# Patient Record
Sex: MALE | Race: WHITE | NOT HISPANIC OR LATINO | URBAN - METROPOLITAN AREA
[De-identification: names, ages, dates, MRNs, and addresses within clinical notes are randomized per-mention and may not be internally consistent; named-entity substitution may affect disease eponyms.]

---

## 2017-06-12 ENCOUNTER — EMERGENCY (EMERGENCY)
Facility: HOSPITAL | Age: 77
LOS: 0 days | Discharge: HOME | End: 2017-06-13
Attending: EMERGENCY MEDICINE
Payer: MEDICARE

## 2017-06-12 VITALS
HEART RATE: 87 BPM | HEIGHT: 68 IN | WEIGHT: 177.91 LBS | DIASTOLIC BLOOD PRESSURE: 84 MMHG | RESPIRATION RATE: 16 BRPM | SYSTOLIC BLOOD PRESSURE: 160 MMHG | OXYGEN SATURATION: 95 % | TEMPERATURE: 99 F

## 2017-06-12 LAB
ALBUMIN SERPL ELPH-MCNC: 4.4 G/DL — SIGNIFICANT CHANGE UP (ref 3.3–5)
ALP SERPL-CCNC: 54 U/L — SIGNIFICANT CHANGE UP (ref 40–120)
ALT FLD-CCNC: 31 U/L — SIGNIFICANT CHANGE UP (ref 12–78)
ANION GAP SERPL CALC-SCNC: 9 MMOL/L — SIGNIFICANT CHANGE UP (ref 5–17)
APTT BLD: 28 SEC — SIGNIFICANT CHANGE UP (ref 27.5–37.4)
AST SERPL-CCNC: 23 U/L — SIGNIFICANT CHANGE UP (ref 15–37)
BASOPHILS # BLD AUTO: 0.1 K/UL — SIGNIFICANT CHANGE UP (ref 0–0.2)
BASOPHILS NFR BLD AUTO: 0.9 % — SIGNIFICANT CHANGE UP (ref 0–2)
BILIRUB SERPL-MCNC: 0.4 MG/DL — SIGNIFICANT CHANGE UP (ref 0.2–1.2)
BUN SERPL-MCNC: 29 MG/DL — HIGH (ref 7–23)
CALCIUM SERPL-MCNC: 9.6 MG/DL — SIGNIFICANT CHANGE UP (ref 8.5–10.1)
CHLORIDE SERPL-SCNC: 105 MMOL/L — SIGNIFICANT CHANGE UP (ref 96–108)
CK MB BLD-MCNC: 0.9 % — SIGNIFICANT CHANGE UP (ref 0–3.5)
CK MB CFR SERPL CALC: 2 NG/ML — SIGNIFICANT CHANGE UP (ref 0.5–3.6)
CK SERPL-CCNC: 214 U/L — SIGNIFICANT CHANGE UP (ref 26–308)
CO2 SERPL-SCNC: 27 MMOL/L — SIGNIFICANT CHANGE UP (ref 22–31)
CREAT SERPL-MCNC: 1.48 MG/DL — HIGH (ref 0.5–1.3)
EOSINOPHIL # BLD AUTO: 0.1 K/UL — SIGNIFICANT CHANGE UP (ref 0–0.5)
EOSINOPHIL NFR BLD AUTO: 1.7 % — SIGNIFICANT CHANGE UP (ref 0–6)
GLUCOSE SERPL-MCNC: 115 MG/DL — HIGH (ref 70–99)
HCT VFR BLD CALC: 40.6 % — SIGNIFICANT CHANGE UP (ref 39–50)
HGB BLD-MCNC: 14.6 G/DL — SIGNIFICANT CHANGE UP (ref 13–17)
INR BLD: 0.97 RATIO — SIGNIFICANT CHANGE UP (ref 0.88–1.16)
LYMPHOCYTES # BLD AUTO: 2.6 K/UL — SIGNIFICANT CHANGE UP (ref 1–3.3)
LYMPHOCYTES # BLD AUTO: 30.2 % — SIGNIFICANT CHANGE UP (ref 13–44)
MCHC RBC-ENTMCNC: 33.4 PG — SIGNIFICANT CHANGE UP (ref 27–34)
MCHC RBC-ENTMCNC: 35.9 GM/DL — SIGNIFICANT CHANGE UP (ref 32–36)
MCV RBC AUTO: 92.9 FL — SIGNIFICANT CHANGE UP (ref 80–100)
MONOCYTES # BLD AUTO: 0.6 K/UL — SIGNIFICANT CHANGE UP (ref 0–0.9)
MONOCYTES NFR BLD AUTO: 6.8 % — SIGNIFICANT CHANGE UP (ref 2–14)
NEUTROPHILS # BLD AUTO: 5.1 K/UL — SIGNIFICANT CHANGE UP (ref 1.8–7.4)
NEUTROPHILS NFR BLD AUTO: 60.4 % — SIGNIFICANT CHANGE UP (ref 43–77)
PLATELET # BLD AUTO: 213 K/UL — SIGNIFICANT CHANGE UP (ref 150–400)
POTASSIUM SERPL-MCNC: 4.1 MMOL/L — SIGNIFICANT CHANGE UP (ref 3.5–5.3)
POTASSIUM SERPL-SCNC: 4.1 MMOL/L — SIGNIFICANT CHANGE UP (ref 3.5–5.3)
PROT SERPL-MCNC: 8.3 GM/DL — SIGNIFICANT CHANGE UP (ref 6–8.3)
PROTHROM AB SERPL-ACNC: 10.6 SEC — SIGNIFICANT CHANGE UP (ref 9.8–12.7)
RBC # BLD: 4.37 M/UL — SIGNIFICANT CHANGE UP (ref 4.2–5.8)
RBC # FLD: 11.4 % — SIGNIFICANT CHANGE UP (ref 11–15)
SODIUM SERPL-SCNC: 141 MMOL/L — SIGNIFICANT CHANGE UP (ref 135–145)
TROPONIN I SERPL-MCNC: <.015 NG/ML — SIGNIFICANT CHANGE UP (ref 0.01–0.04)
WBC # BLD: 8.5 K/UL — SIGNIFICANT CHANGE UP (ref 3.8–10.5)
WBC # FLD AUTO: 8.5 K/UL — SIGNIFICANT CHANGE UP (ref 3.8–10.5)

## 2017-06-12 PROCEDURE — 99285 EMERGENCY DEPT VISIT HI MDM: CPT

## 2017-06-12 PROCEDURE — 71010: CPT | Mod: 26

## 2017-06-12 PROCEDURE — 70450 CT HEAD/BRAIN W/O DYE: CPT | Mod: 26

## 2017-06-12 RX ORDER — SODIUM CHLORIDE 9 MG/ML
3 INJECTION INTRAMUSCULAR; INTRAVENOUS; SUBCUTANEOUS ONCE
Qty: 0 | Refills: 0 | Status: COMPLETED | OUTPATIENT
Start: 2017-06-12 | End: 2017-06-12

## 2017-06-12 RX ORDER — SIMVASTATIN 20 MG/1
1 TABLET, FILM COATED ORAL
Qty: 0 | Refills: 0 | COMMUNITY

## 2017-06-12 RX ORDER — GABAPENTIN 400 MG/1
1 CAPSULE ORAL
Qty: 0 | Refills: 0 | COMMUNITY

## 2017-06-12 RX ADMIN — SODIUM CHLORIDE 3 MILLILITER(S): 9 INJECTION INTRAMUSCULAR; INTRAVENOUS; SUBCUTANEOUS at 22:24

## 2017-06-12 NOTE — ED PROVIDER NOTE - OBJECTIVE STATEMENT
76yo male with pmh neuropathy on gabapentin, HL, presents with mild generalized dull headache x 2 days. Pt was in [facility] yesterday at [facility] with syncope vs seizure. Per wife, pt was standing and she noted rt arm shaking. Then he sat down, shaking both arms and staring for ~ 1 min. Then pt came right out of it. Pt does not recall event. no neck stiffness, meningmus, fever, photophobia.     ROS: No fever/chills. No photophobia/eye pain/changes in vision, No ear pain/sore throat/dysphagia, No chest pain/palpitations. No SOB/cough/stridor. No abdominal pain, N/V/D, no black/bloody bm. No dysuria/frequency/discharge, + headache. No Dizziness.  No rash.  No numbness/tingling/weakness.

## 2017-06-12 NOTE — ED PROVIDER NOTE - MEDICAL DECISION MAKING DETAILS
Lab values do not require emergent intervention. CT scan demonstrates no acute pathology. Pt well appearing. Asymptomatic. Pt does not want to stay and wants to fu with pmd. Discussed results and outcome of testing with the patient.  Patient given copy of available results. Patient advised to please follow up with their PMD within the next 24 hours and return to the Emergency Department for worsening symptoms or any other concerns.

## 2017-06-12 NOTE — ED ADULT TRIAGE NOTE - CHIEF COMPLAINT QUOTE
Sent by Dr Fan for evaluation of headache x 2 days, seizure like activity yesterday vs syncope, pt didn't want to go in ambulance yesterday

## 2017-06-12 NOTE — ED ADULT NURSE NOTE - OBJECTIVE STATEMENT
As per wife, patient was in [facility], stood up  and became pale, breathing funny, hands became shaky, spaced out. Patient did not want to go to the hosptial yesterday. Sent by PMD for. Patient co headache x3 days, denies N/V. No hx of seizures.

## 2017-06-13 VITALS
OXYGEN SATURATION: 100 % | RESPIRATION RATE: 19 BRPM | TEMPERATURE: 98 F | HEART RATE: 74 BPM | DIASTOLIC BLOOD PRESSURE: 80 MMHG | SYSTOLIC BLOOD PRESSURE: 169 MMHG

## 2017-06-13 DIAGNOSIS — R56.9 UNSPECIFIED CONVULSIONS: ICD-10-CM

## 2017-06-13 DIAGNOSIS — N40.0 BENIGN PROSTATIC HYPERPLASIA WITHOUT LOWER URINARY TRACT SYMPTOMS: ICD-10-CM

## 2017-06-13 DIAGNOSIS — G62.9 POLYNEUROPATHY, UNSPECIFIED: ICD-10-CM

## 2017-06-13 DIAGNOSIS — E78.5 HYPERLIPIDEMIA, UNSPECIFIED: ICD-10-CM

## 2017-06-13 DIAGNOSIS — R51 HEADACHE: ICD-10-CM

## 2022-06-08 ENCOUNTER — FORM ENCOUNTER (OUTPATIENT)
Age: 82
End: 2022-06-08

## 2022-06-09 ENCOUNTER — APPOINTMENT (OUTPATIENT)
Dept: MRI IMAGING | Facility: CLINIC | Age: 82
End: 2022-06-09
Payer: MEDICARE

## 2022-06-09 ENCOUNTER — APPOINTMENT (OUTPATIENT)
Dept: ORTHOPEDIC SURGERY | Facility: CLINIC | Age: 82
End: 2022-06-09
Payer: MEDICARE

## 2022-06-09 VITALS — WEIGHT: 173 LBS | HEIGHT: 68 IN | BODY MASS INDEX: 26.22 KG/M2

## 2022-06-09 DIAGNOSIS — G62.9 POLYNEUROPATHY, UNSPECIFIED: ICD-10-CM

## 2022-06-09 DIAGNOSIS — I10 ESSENTIAL (PRIMARY) HYPERTENSION: ICD-10-CM

## 2022-06-09 DIAGNOSIS — M54.16 RADICULOPATHY, LUMBAR REGION: ICD-10-CM

## 2022-06-09 PROCEDURE — 72170 X-RAY EXAM OF PELVIS: CPT

## 2022-06-09 PROCEDURE — 72100 X-RAY EXAM L-S SPINE 2/3 VWS: CPT

## 2022-06-09 PROCEDURE — 72148 MRI LUMBAR SPINE W/O DYE: CPT | Mod: MH

## 2022-06-09 PROCEDURE — 99204 OFFICE O/P NEW MOD 45 MIN: CPT

## 2022-06-09 PROCEDURE — 73110 X-RAY EXAM OF WRIST: CPT | Mod: LT

## 2022-06-09 RX ORDER — ATORVASTATIN CALCIUM 20 MG/1
20 TABLET, FILM COATED ORAL
Refills: 0 | Status: ACTIVE | COMMUNITY

## 2022-06-09 RX ORDER — AMLODIPINE BESYLATE AND BENAZEPRIL HYDROCHLORIDE 5; 20 MG/1; MG/1
5-20 CAPSULE ORAL
Refills: 0 | Status: ACTIVE | COMMUNITY

## 2022-06-16 ENCOUNTER — APPOINTMENT (OUTPATIENT)
Dept: ORTHOPEDIC SURGERY | Facility: CLINIC | Age: 82
End: 2022-06-16

## 2022-06-16 VITALS — WEIGHT: 173 LBS | BODY MASS INDEX: 26.22 KG/M2 | HEIGHT: 68 IN

## 2022-06-16 PROCEDURE — 99214 OFFICE O/P EST MOD 30 MIN: CPT | Mod: 25

## 2022-06-16 PROCEDURE — 20550 NJX 1 TENDON SHEATH/LIGAMENT: CPT

## 2022-06-16 PROCEDURE — 20526 THER INJECTION CARP TUNNEL: CPT | Mod: LT,59

## 2022-06-16 NOTE — REASON FOR VISIT
[Spouse] : spouse [FreeTextEntry2] : follow up for left writst. patient states numbness in left wrist

## 2022-06-16 NOTE — HISTORY OF PRESENT ILLNESS
[de-identified] : 6/16/22:  Pt has n/t in left hand at night.  The n/t is intermittent.  He does not have n/t with night bracing.  Right RF triggering\par \par LHD\par \par EMG:\par 1. Moderate left carpal tunnel\par 2. C6 radiculopathy

## 2022-06-16 NOTE — IMAGING
[de-identified] : left hand no swelling or deformity\par No thenar atrophy\par full actve range of motion\par decreased sensation to the median nerve\par intact ulnar and radial sensation\par ain/pin/ulnar motor intact\par +tinels, phalens and durkans, negative spurling sign\par palpable pulses with CR<2s\par full motion to the elbow, shoulder\par

## 2022-06-21 NOTE — HISTORY OF PRESENT ILLNESS
[5] : 5 [3] : 3 [Dull/Aching] : dull/aching [Constant] : constant [Rest] : rest [de-identified] : back pain and last few months and increased pain r leg.  for months . No falls or injuries and did not lift anything. No numbness and no giving way. . Gabapentin. . Neuropathy. No known cause neuropathy. On gabapentin 800 mg 3 times a day.\par Tingling left hand and  and numbness fingers, Had emg and ncs in past few months.. No recent mri. . No neck pain,.\par \par Referred to see me from Dr Huang. [] : no [FreeTextEntry5] : pt feels numbness in left hand at night.  the pain in the back has  been bad. pt has been using a brace in the left hand.  pt feels discomfort in the back .  [de-identified] : motion

## 2022-06-21 NOTE — IMAGING
[Disc space narrowing] : Disc space narrowing [Spondylolithesis] : Spondylolithesis [AP] : anteroposterior [There are no fractures, subluxations or dislocations. No significant abnormalities are seen] : There are no fractures, subluxations or dislocations. No significant abnormalities are seen [Mild arthritis (Tonnis Grade 1)] : Mild arthritis (Tonnis Grade 1) [Left] : left wrist [Degenerative change] : Degenerative change [FreeTextEntry8] : marked cmc oa

## 2022-06-21 NOTE — PHYSICAL EXAM
[Flexion] : flexion [Extension] : extension [Left] : left hand [CMC Joint] : CMC joint [2nd Finger] : 2nd finger [3rd Finger] : 3rd finger [4th Finger] : 4th finger [Volar Wrist] : volar wrist [5___] : pinch 5[unfilled]/5 [de-identified] : extension 10 degrees [de-identified] : left lateral bending 15 degrees [de-identified] : right lateral bending 15 degrees [de-identified] : right lateral bending 15 degrees [] : negative Finkelstein's [TWNoteComboBox7] : dorsiflexion 60 degrees [TWNoteComboBox4] : volarflexion 70 degrees

## 2022-06-21 NOTE — DISCUSSION/SUMMARY
[de-identified] : The patient was advised of the diagnosis. The natural history of the pathology was explained in full to the patient in layman's terms. All questions were answered. The risks and benefits of surgical and non-surgical treatment alternatives were explained in full to the patient.\par \par Carpal tunnel discussed and has emg with severe carpal tunnel. cortisone injection would be very temporary and advised to see Dr Richardson for carpal tunnel release \par \par Lumbar spine has failed at PT and advised to have mri lumbar spine and return for reeval and likely referral to pain management. all explained

## 2022-06-21 NOTE — DATA REVIEWED
[EMG Nerve Conduction] : A EMG Nerve Conduction test was completed of the [Bilateral] : bilateral [FreeTextEntry1] : left carpal tunnel and left cervical radiculopathy

## 2022-06-22 ENCOUNTER — APPOINTMENT (OUTPATIENT)
Dept: ORTHOPEDIC SURGERY | Facility: CLINIC | Age: 82
End: 2022-06-22
Payer: MEDICARE

## 2022-06-22 VITALS — WEIGHT: 173 LBS | BODY MASS INDEX: 26.22 KG/M2 | HEIGHT: 68 IN

## 2022-06-22 DIAGNOSIS — M48.061 SPINAL STENOSIS, LUMBAR REGION WITHOUT NEUROGENIC CLAUDICATION: ICD-10-CM

## 2022-06-22 DIAGNOSIS — R93.7 ABNORMAL FINDINGS ON DIAGNOSTIC IMAGING OF OTHER PARTS OF MUSCULOSKELETAL SYSTEM: ICD-10-CM

## 2022-06-22 PROCEDURE — 99214 OFFICE O/P EST MOD 30 MIN: CPT

## 2022-07-01 ENCOUNTER — APPOINTMENT (OUTPATIENT)
Dept: PAIN MANAGEMENT | Facility: CLINIC | Age: 82
End: 2022-07-01

## 2022-07-01 VITALS — WEIGHT: 172 LBS | HEIGHT: 68 IN | BODY MASS INDEX: 26.07 KG/M2

## 2022-07-01 PROCEDURE — 99204 OFFICE O/P NEW MOD 45 MIN: CPT

## 2022-07-01 PROCEDURE — 99214 OFFICE O/P EST MOD 30 MIN: CPT

## 2022-07-01 NOTE — DATA REVIEWED
[FreeTextEntry1] : MRI of the lumbar spine was reviewed and independently interpreted by me:\par 1. Nonspecific moderate patchy marrow signal heterogeneity throughout the osseous structures in addition to degenerative endplate changes in the lower lumbar spine. Recommend whole body bone scan to confirm that there is no underlying diffuse marrow replacement process.\par 2. Convexity of the lumbar spine towards the left, exaggerated lower lumbar lordosis, anterolisthesis at L5-S1 and multilevel degenerative disc disease with multilevel disc herniations, central stenosis, and nerve root impingement which appears most severe on the right greater than left at L4-L5.\par 3. Degenerative changes in the lower thoracic spine.\par 4. Findings suggesting large bilateral renal cysts right greater than left measuring up to 5 cm in the superior right kidney. Consider ultrasound of the kidneys to further evaluate as clinically indicated.\par 5. No acute fracture is suspected.\par 6. No gross retroperitoneal adenopathy or gross retroperitoneal/paravertebral soft tissue mass.

## 2022-07-01 NOTE — REASON FOR VISIT
How Severe Are Your Spot(S)?: mild Hpi Title: Evaluation of Skin Lesions Year Removed: 1900 [Initial Consultation] : an initial pain management consultation

## 2022-07-01 NOTE — PHYSICAL EXAM
[de-identified] : Constitutional; Appears well, no apparent distress\par Ability to communicate: Normal \par Respiratory: non-labored breathing\par Skin: No rash noted\par Head: Normocephalic, atraumatic\par Neck: no visible thyroid enlargement\par Eyes: Extraocular movements intact\par Neurologic: Alert and oriented x3\par Psychiatric: normal mood, affect and behavior \par \par  [Flexion] : flexion [Extension] : extension [] : non-antalgic

## 2022-07-01 NOTE — DISCUSSION/SUMMARY
[de-identified] : After discussing various treatment options with the patient including but not limited to oral medications, physical therapy, exercise modalities as well as interventional spinal injections, we have decided with the following plan:\par \par - Continue Home exercises, stretching, activity modification, physical therapy, and conservative care.\par - MRI report and/or images was reviewed and discussed with the patient.\par - Recommend Right L4-5, L5-S1 Transforaminal Epidural Steroid Injection under fluoroscopic guidance with image.\par - The risks, benefits and alternatives of the proposed procedure were explained in detail with the patient. The risks outlined include but are not limited to infection, bleeding, post-dural puncture headache, nerve injury, a temporary increase in pain, failure to resolve symptoms, allergic reaction, symptom recurrence, and possible elevation of blood sugar in diabetics. All questions were answered to patient's apparent satisfaction and he/she verbalized an understanding.\par - Patient is presenting with acute/sub-acute radicular pain with impairment in ADLs and functionality.  The pain has not responded to conservative care including NSAID therapy and/or physical therapy.  There is no bleeding tendency, unstable medical condition, or systemic infection.\par - Follow up in 1-2 weeks post injection for re-evaluation.\par

## 2022-07-01 NOTE — HISTORY OF PRESENT ILLNESS
[Lower back] : lower back [8] : 8 [6] : 6 [Dull/Aching] : dull/aching [Radiating] : radiating [Sharp] : sharp [Constant] : constant [Physical therapy] : physical therapy [Walking] : walking [] : no [FreeTextEntry1] : right  [FreeTextEntry7] : right buttock  [de-identified] : turning  [de-identified] : L MRI

## 2022-07-03 NOTE — PHYSICAL EXAM
[Volar Wrist] : volar wrist [Flexion] : flexion [Extension] : extension [Left] : left hand [CMC Joint] : CMC joint [5___] : pinch 5[unfilled]/5 [2nd Finger] : 2nd finger [3rd Finger] : 3rd finger [4th Finger] : 4th finger [de-identified] : extension 10 degrees [de-identified] : left lateral bending 15 degrees [de-identified] : right lateral bending 15 degrees [de-identified] : right lateral bending 15 degrees [] : negative Phalen's [TWNoteComboBox7] : dorsiflexion 60 degrees [TWNoteComboBox4] : volarflexion 70 degrees

## 2022-07-03 NOTE — DISCUSSION/SUMMARY
[de-identified] : discussed work up marrow edema and to see PCP Dr Ireland for blood tests to include SPEP, CBC, CMP, CEA and urine for bence Keyes proteins to r/o a systemic process. all explained. pain maanggement to see, continue swimming. for renal cyst to see his urologist. all explained to him and his wife

## 2022-07-03 NOTE — DISCUSSION/SUMMARY
[de-identified] : discussed work up marrow edema and to see PCP Dr Ireland for blood tests to include SPEP, CBC, CMP, CEA and urine for bence Keyes proteins to r/o a systemic process. all explained. pain maanggement to see, continue swimming. for renal cyst to see his urologist. all explained to him and his wife

## 2022-07-03 NOTE — PHYSICAL EXAM
[Volar Wrist] : volar wrist [Flexion] : flexion [Extension] : extension [Left] : left hand [CMC Joint] : CMC joint [5___] : pinch 5[unfilled]/5 [2nd Finger] : 2nd finger [3rd Finger] : 3rd finger [4th Finger] : 4th finger [de-identified] : extension 10 degrees [de-identified] : left lateral bending 15 degrees [de-identified] : right lateral bending 15 degrees [de-identified] : right lateral bending 15 degrees [] : negative Phalen's [TWNoteComboBox7] : dorsiflexion 60 degrees [TWNoteComboBox4] : volarflexion 70 degrees

## 2022-07-03 NOTE — HISTORY OF PRESENT ILLNESS
[de-identified] : back and leg pain persists and wants to consider epidurals. also discussed hands that were injected and have done wll

## 2022-07-18 ENCOUNTER — APPOINTMENT (OUTPATIENT)
Dept: ORTHOPEDIC SURGERY | Facility: CLINIC | Age: 82
End: 2022-07-18

## 2022-07-18 VITALS — BODY MASS INDEX: 26.07 KG/M2 | WEIGHT: 172 LBS | HEIGHT: 68 IN

## 2022-07-18 LAB — SARS-COV-2 N GENE NPH QL NAA+PROBE: NOT DETECTED

## 2022-07-18 PROCEDURE — 99213 OFFICE O/P EST LOW 20 MIN: CPT

## 2022-07-18 NOTE — IMAGING
[de-identified] : left hand no swelling or deformity\par No thenar atrophy\par full active range of motion\par normal sensation to the median nerve distribution. \par intact ulnar and radial sensation\par ain/pin/ulnar motor intact\par -tinels, phalens and durkans, negative spurling sign\par palpable pulses with CR<2s\par full motion to the elbow, shoulder\par

## 2022-07-18 NOTE — ASSESSMENT
[FreeTextEntry1] : Right ring trigger finger and left carpal tunnel syndrome has resolved s/p CSI?\par Pt is allowed to rto prn.

## 2022-07-18 NOTE — HISTORY OF PRESENT ILLNESS
[de-identified] : 7/18/2022: Pt states right ring and left carpal tunnel syndrome has resolved.\par \par 6/16/22:  Pt has n/t in left hand at night.  The n/t is intermittent.  He does not have n/t with night bracing.  Right RF triggering\par \par LHD\par \par EMG:\par 1. Moderate left carpal tunnel\par 2. C6 radiculopathy

## 2022-07-19 ENCOUNTER — APPOINTMENT (OUTPATIENT)
Dept: PAIN MANAGEMENT | Facility: CLINIC | Age: 82
End: 2022-07-19

## 2022-07-19 PROCEDURE — 64483 NJX AA&/STRD TFRM EPI L/S 1: CPT | Mod: RT

## 2022-07-19 PROCEDURE — 64484 NJX AA&/STRD TFRM EPI L/S EA: CPT | Mod: RT

## 2022-07-19 PROCEDURE — J3490M: CUSTOM

## 2022-07-19 NOTE — PROCEDURE
[FreeTextEntry3] : Date of Service: 07/19/2022 \par \par Account: [id number]\par \par Patient: GUSTAVO TENA \par \par YOB: 1940\par \par Age: 82 year\par \par \par Surgeon:                                                          Petey Walsh D.O.\par \par Assistant:                                                         None.\par \par Pre-Operative Diagnosis:                             Lumbosacral radiculitis (M54.17)\par \par Post-Operative Diagnosis:                           Same\par \par Procedure:                                                      Right L4-5, L5-S1 transforaminal epidural steroid injection under fluoroscopic guidance.\par \par Anesthesia:                                                     Local with MAC\par \par \par This procedure was carried out using fluoroscopic guidance.  The risks and benefits of the procedure were discussed extensively with the patient.  The consent of the patient was obtained and the following procedure was performed. The patient was placed in the prone position on the fluoroscopic table and the lumbar area was prepped and draped in a sterile fashion. A timeout was performed with all essential staff present and the site and side were verified.\par \par The right L4-5, L5-S1 neural foramen were identified on right oblique "agustín dog" anatomical view at the 6 o'clock position using fluoroscopic guidance, and the area was marked. The overlying skin and subcutaneous structures were anesthetized using sterile technique with 1% Lidocaine.  A 22-gauge spinal needle was directed toward the inferior (6 o'clock) position of the pedicle, which formed the roof of the identified foramen.  Once in the epidural space, after negative aspiration for heme and CSF, 1 cc of Omnipaque contrast was injected under live fluoroscopy to confirm epidural location and assess filling defects and rule out intravascular needle placement. \par \par The following contrast flow and epidurogram was observed: no intravascular or intrathecal flow pattern was noted.  No blood or CSF was aspirated. Contrast spread appeared to outline the right L4 and L5 nerve roots and spread medially into the epidural space.  \par \par After this, 3 ml of a total mixture of 12 mg betamethasone, 2 ml of preservative free normal saline, and 2 ml of 0.25% bupivacaine was administered without any resistance in the epidural space at each of the two levels. \par \par The needle was subsequently removed.  Vital signs remained normal.  Pulse oximeter was used throughout the procedure and the patient's pulse and oxygen saturation remained within normal limits.  The patient tolerated the procedure well.  There were no complications.  The patient was instructed to apply ice over the injection sites for twenty minutes every two hours for the next 24 to 48 hours.  The patient was also instructed to contact me immediately if there were any problems.\par \par Petey Walsh D.O.\par

## 2022-08-19 ENCOUNTER — APPOINTMENT (OUTPATIENT)
Dept: PAIN MANAGEMENT | Facility: CLINIC | Age: 82
End: 2022-08-19

## 2022-08-19 VITALS — WEIGHT: 171 LBS | BODY MASS INDEX: 25.91 KG/M2 | HEIGHT: 68 IN

## 2022-08-19 DIAGNOSIS — Z00.00 ENCOUNTER FOR GENERAL ADULT MEDICAL EXAMINATION W/OUT ABNORMAL FINDINGS: ICD-10-CM

## 2022-08-19 PROCEDURE — 99214 OFFICE O/P EST MOD 30 MIN: CPT

## 2022-08-19 NOTE — DISCUSSION/SUMMARY
[de-identified] : After discussing various treatment options with the patient including but not limited to oral medications, physical therapy, exercise modalities as well as interventional spinal injections, we have decided with the following plan:\par \par - Continue Home exercises, stretching, activity modification, physical therapy, and conservative care.\par - MRI report and/or images was reviewed and discussed with the patient.\par - Recommend Right L4-5, L5-S1 Transforaminal Epidural Steroid Injection under fluoroscopic guidance with image.\par - The risks, benefits and alternatives of the proposed procedure were explained in detail with the patient. The risks outlined include but are not limited to infection, bleeding, post-dural puncture headache, nerve injury, a temporary increase in pain, failure to resolve symptoms, allergic reaction, symptom recurrence, and possible elevation of blood sugar in diabetics. All questions were answered to patient's apparent satisfaction and he/she verbalized an understanding.\par - Patient is presenting with acute/sub-acute radicular pain with impairment in ADLs and functionality.  The pain has not responded to conservative care including NSAID therapy and/or physical therapy.  There is no bleeding tendency, unstable medical condition, or systemic infection.\par - Follow up in 1-2 weeks post injection for re-evaluation.\par

## 2022-08-19 NOTE — PHYSICAL EXAM
[Flexion] : flexion [Extension] : extension [de-identified] : Constitutional; Appears well, no apparent distress\par Ability to communicate: Normal \par Respiratory: non-labored breathing\par Skin: No rash noted\par Head: Normocephalic, atraumatic\par Neck: no visible thyroid enlargement\par Eyes: Extraocular movements intact\par Neurologic: Alert and oriented x3\par Psychiatric: normal mood, affect and behavior \par \par  [] : non-antalgic

## 2022-08-19 NOTE — HISTORY OF PRESENT ILLNESS
[Lower back] : lower back [Dull/Aching] : dull/aching [Radiating] : radiating [Sharp] : sharp [Physical therapy] : physical therapy [Walking] : walking [3] : 3 [0] : 0 [Intermittent] : intermittent [] : no [FreeTextEntry1] : right  [de-identified] : turning  [de-identified] : L MRI

## 2022-10-20 ENCOUNTER — APPOINTMENT (OUTPATIENT)
Dept: ORTHOPEDIC SURGERY | Facility: CLINIC | Age: 82
End: 2022-10-20
Payer: MEDICARE

## 2022-10-20 VITALS — WEIGHT: 171 LBS | BODY MASS INDEX: 25.91 KG/M2 | HEIGHT: 68 IN

## 2022-10-20 DIAGNOSIS — M65.341 TRIGGER FINGER, RIGHT RING FINGER: ICD-10-CM

## 2022-10-20 PROCEDURE — 20550 NJX 1 TENDON SHEATH/LIGAMENT: CPT

## 2022-10-20 PROCEDURE — 99214 OFFICE O/P EST MOD 30 MIN: CPT | Mod: 25

## 2022-10-20 PROCEDURE — 20526 THER INJECTION CARP TUNNEL: CPT | Mod: 59,LT

## 2022-10-20 NOTE — IMAGING
[de-identified] : left hand no swelling or deformity\par No thenar atrophy\par full active range of motion\par normal sensation to the median nerve distribution. \par intact ulnar and radial sensation\par ain/pin/ulnar motor intact\par -tinels, phalens and durkans, negative spurling sign\par palpable pulses with CR<2s\par full motion to the elbow, shoulder\par \par right ring finger TTP a1 pulley\par +triggering\par otherwise farom\par neurovascular intact distally\par \par

## 2022-10-20 NOTE — HISTORY OF PRESENT ILLNESS
[de-identified] : 10/20/22:  Pt has returned n/t in fingers in left hand and triggering in right ring finger after both CSI's in June.\par \par 7/18/2022: Pt states right ring and left carpal tunnel syndrome has resolved.\par \par 6/16/22:  Pt has n/t in left hand at night.  The n/t is intermittent.  He does not have n/t with night bracing.  Right RF triggering\par \par LHD\par \par EMG:\par 1. Moderate left carpal tunnel\par 2. C6 radiculopathy [] : Post Surgical Visit: no [FreeTextEntry1] : left wrist/right ring finger  [de-identified] : none

## 2022-12-12 LAB — SARS-COV-2 N GENE NPH QL NAA+PROBE: NOT DETECTED

## 2022-12-14 ENCOUNTER — APPOINTMENT (OUTPATIENT)
Age: 82
End: 2022-12-14

## 2022-12-14 PROCEDURE — 64483 NJX AA&/STRD TFRM EPI L/S 1: CPT | Mod: RT

## 2022-12-14 PROCEDURE — 64484 NJX AA&/STRD TFRM EPI L/S EA: CPT | Mod: RT

## 2023-01-06 ENCOUNTER — APPOINTMENT (OUTPATIENT)
Dept: PAIN MANAGEMENT | Facility: CLINIC | Age: 83
End: 2023-01-06
Payer: MEDICARE

## 2023-01-06 VITALS — BODY MASS INDEX: 25.91 KG/M2 | HEIGHT: 68 IN | WEIGHT: 171 LBS

## 2023-01-06 DIAGNOSIS — M51.36 OTHER INTERVERTEBRAL DISC DEGENERATION, LUMBAR REGION: ICD-10-CM

## 2023-01-06 DIAGNOSIS — M54.50 LOW BACK PAIN, UNSPECIFIED: ICD-10-CM

## 2023-01-06 DIAGNOSIS — M51.26 OTHER INTERVERTEBRAL DISC DISPLACEMENT, LUMBAR REGION: ICD-10-CM

## 2023-01-06 DIAGNOSIS — M54.17 RADICULOPATHY, LUMBOSACRAL REGION: ICD-10-CM

## 2023-01-06 PROCEDURE — 99213 OFFICE O/P EST LOW 20 MIN: CPT

## 2023-01-06 NOTE — HISTORY OF PRESENT ILLNESS
[Lower back] : lower back [3] : 3 [0] : 0 [Dull/Aching] : dull/aching [Radiating] : radiating [Sharp] : sharp [Intermittent] : intermittent [Physical therapy] : physical therapy [Walking] : walking [] : no [de-identified] : turning  [de-identified] : L MRI  [FreeTextEntry1] : 01/06/2023: S/p Right L4-5, L5-S1 TFESI on 12/14/2022 with >60% relief and improvement of ADLs. Has been doing HEP. \par \par 08/19/2022: s/p right L4-5, L5-S1 TFESI on 7/19/22 with 70% relief and improvement of ADLs. Pain is much less severe and more intermittent. \par \par Initial HPI 07/01/2022: \par Pain started 3 months ago and is on the RIGHT side of the lower back and radiates into the right buttock described as a sharp shooting pain when turning with soreness at rest with no associated numbness and tingling. Had full body scan as per MRI but no results yet. \par \par Has L MRI \par Physical Therapy: Has done PT and now doing exercises every morning. \par Pain Medications: Advil PRN\par Past Injections: none\par Spine surgery: none \par Blood thinners: none\par

## 2023-01-06 NOTE — DISCUSSION/SUMMARY
[de-identified] : After discussing various treatment options with the patient including but not limited to oral medications, physical therapy, exercise modalities as well as interventional spinal injections, we have decided with the following plan:\par \par - Continue home exercises, stretching, activity modification, physical therapy, and conservative care.\par - Follow-up as needed.\par - The patient was counseled and asked to make an attempt to lose weight, and improve diet and exercise patterns to aid in medical management.\par - Continue Advil PRN. Potential adverse effects of NSAIDs including but not limited to bleeding, ulcers, increased risk of hypertension, heart disease, kidney disease and stroke were discussed with the patient who understands risks. Patient advised to speak to internist or PMD if any problems with heart, blood pressure, or GI system exists before starting medications. Medication is allowing pt to be more mobile and perform ADLs. Will continue to monitor patient and attempt to wean as soon as possible.\par

## 2023-01-06 NOTE — PHYSICAL EXAM
[Flexion] : flexion [de-identified] : Constitutional; Appears well, no apparent distress\par Ability to communicate: Normal \par Respiratory: non-labored breathing\par Skin: No rash noted\par Head: Normocephalic, atraumatic\par Neck: no visible thyroid enlargement\par Eyes: Extraocular movements intact\par Neurologic: Alert and oriented x3\par Psychiatric: normal mood, affect and behavior \par \par  [] : non-antalgic

## 2023-03-30 ENCOUNTER — APPOINTMENT (OUTPATIENT)
Dept: ORTHOPEDIC SURGERY | Facility: CLINIC | Age: 83
End: 2023-03-30
Payer: MEDICARE

## 2023-03-30 VITALS — BODY MASS INDEX: 25.91 KG/M2 | HEIGHT: 68 IN | WEIGHT: 171 LBS

## 2023-03-30 PROCEDURE — 99214 OFFICE O/P EST MOD 30 MIN: CPT

## 2023-03-30 NOTE — IMAGING
[de-identified] : left hand no swelling or deformity\par No thenar atrophy\par full active range of motion\par normal sensation to the median nerve distribution. \par intact ulnar and radial sensation\par ain/pin/ulnar motor intact\par -tinels, phalens and durkans, negative spurling sign\par palpable pulses with CR<2s\par full motion to the elbow, shoulder\par \par right ring finger TTP a1 pulley\par +triggering\par otherwise farom\par neurovascular intact distally\par \par

## 2023-03-30 NOTE — HISTORY OF PRESENT ILLNESS
[10] : 10 [2] : 2 [Dull/Aching] : dull/aching [Radiating] : radiating [Intermittent] : intermittent [Leisure] : leisure [Sleep] : sleep [de-identified] : 3/30/23:  Pt still wakes up with n/t in the fingers of his left hand.\par \par 10/20/22:  Pt has returned n/t in fingers in left hand and triggering in right ring finger after both CSI's in June.\par \par 7/18/2022: Pt states right ring and left carpal tunnel syndrome has resolved.\par \par 6/16/22:  Pt has n/t in left hand at night.  The n/t is intermittent.  He does not have n/t with night bracing.  Right RF triggering\par \par LHD\par \par EMG:\par 1. Moderate left carpal tunnel\par 2. C6 radiculopathy [] : no [FreeTextEntry1] : Left hand [FreeTextEntry7] : wrist [de-identified] : night/activity

## 2023-04-27 RX ORDER — ACETAMINOPHEN AND CODEINE 300; 30 MG/1; MG/1
300-30 TABLET ORAL EVERY 6 HOURS
Qty: 12 | Refills: 0 | Status: ACTIVE | COMMUNITY
Start: 2023-04-27 | End: 1900-01-01

## 2023-04-28 ENCOUNTER — APPOINTMENT (OUTPATIENT)
Age: 83
End: 2023-04-28
Payer: MEDICARE

## 2023-04-28 PROCEDURE — 64721 CARPAL TUNNEL SURGERY: CPT | Mod: LT

## 2023-05-11 ENCOUNTER — APPOINTMENT (OUTPATIENT)
Dept: ORTHOPEDIC SURGERY | Facility: CLINIC | Age: 83
End: 2023-05-11
Payer: MEDICARE

## 2023-05-11 VITALS — HEIGHT: 68 IN | WEIGHT: 171 LBS | BODY MASS INDEX: 25.91 KG/M2

## 2023-05-11 DIAGNOSIS — G56.02 CARPAL TUNNEL SYNDROME, LEFT UPPER LIMB: ICD-10-CM

## 2023-05-11 PROCEDURE — 99024 POSTOP FOLLOW-UP VISIT: CPT

## 2023-05-11 NOTE — IMAGING
[de-identified] : wound clean dry and intact\par no erythema\par no drainage\par FAROM\par NV improved\par sutures removed\par

## 2023-05-11 NOTE — HISTORY OF PRESENT ILLNESS
[9] : 9 [3] : 3 [Dull/Aching] : dull/aching [de-identified] : DOS: 4/28/23- LEFT CARPAL TUNNEL RELEASE\par \par 5/11/23:  Pt has some post op soreness in his hand, wrist, and forearm.  N/t has improved, it no longer wakes him from sleep.\par \par 3/30/23:  Pt still wakes up with n/t in the fingers of his left hand.\par \par 10/20/22:  Pt has returned n/t in fingers in left hand and triggering in right ring finger after both CSI's in June.\par \par 7/18/2022: Pt states right ring and left carpal tunnel syndrome has resolved.\par \par 6/16/22:  Pt has n/t in left hand at night.  The n/t is intermittent.  He does not have n/t with night bracing.  Right RF triggering\par \par LHD\par \par EMG:\par 1. Moderate left carpal tunnel\par 2. C6 radiculopathy [] : no [FreeTextEntry1] : let wrist [FreeTextEntry6] : soreness [de-identified] : 4/28/23

## 2024-06-24 ENCOUNTER — APPOINTMENT (OUTPATIENT)
Dept: MRI IMAGING | Facility: CLINIC | Age: 84
End: 2024-06-24
Payer: MEDICARE

## 2024-06-24 ENCOUNTER — APPOINTMENT (OUTPATIENT)
Dept: ORTHOPEDIC SURGERY | Facility: CLINIC | Age: 84
End: 2024-06-24
Payer: MEDICARE

## 2024-06-24 VITALS — HEIGHT: 68 IN | BODY MASS INDEX: 26.07 KG/M2 | WEIGHT: 172 LBS

## 2024-06-24 DIAGNOSIS — M47.22 OTHER SPONDYLOSIS WITH RADICULOPATHY, CERVICAL REGION: ICD-10-CM

## 2024-06-24 DIAGNOSIS — M19.011 PRIMARY OSTEOARTHRITIS, RIGHT SHOULDER: ICD-10-CM

## 2024-06-24 PROCEDURE — 73030 X-RAY EXAM OF SHOULDER: CPT | Mod: RT

## 2024-06-24 PROCEDURE — 72040 X-RAY EXAM NECK SPINE 2-3 VW: CPT

## 2024-06-24 PROCEDURE — 99214 OFFICE O/P EST MOD 30 MIN: CPT

## 2024-06-24 PROCEDURE — 72141 MRI NECK SPINE W/O DYE: CPT | Mod: MH

## 2024-06-24 RX ORDER — METHYLPREDNISOLONE 4 MG/1
4 TABLET ORAL
Qty: 1 | Refills: 0 | Status: ACTIVE | COMMUNITY
Start: 2024-06-24

## 2024-07-05 ENCOUNTER — APPOINTMENT (OUTPATIENT)
Dept: PAIN MANAGEMENT | Facility: CLINIC | Age: 84
End: 2024-07-05

## 2024-07-05 ENCOUNTER — APPOINTMENT (OUTPATIENT)
Dept: PAIN MANAGEMENT | Facility: CLINIC | Age: 84
End: 2024-07-05
Payer: MEDICARE

## 2024-07-05 VITALS — HEIGHT: 68 IN | BODY MASS INDEX: 26.07 KG/M2 | WEIGHT: 172 LBS

## 2024-07-05 DIAGNOSIS — M54.2 CERVICALGIA: ICD-10-CM

## 2024-07-05 DIAGNOSIS — M54.12 RADICULOPATHY, CERVICAL REGION: ICD-10-CM

## 2024-07-05 PROCEDURE — 99214 OFFICE O/P EST MOD 30 MIN: CPT

## 2024-07-24 ENCOUNTER — APPOINTMENT (OUTPATIENT)
Age: 84
End: 2024-07-24
Payer: MEDICARE

## 2024-07-24 PROCEDURE — 62321 NJX INTERLAMINAR CRV/THRC: CPT

## 2024-08-09 ENCOUNTER — APPOINTMENT (OUTPATIENT)
Dept: PAIN MANAGEMENT | Facility: CLINIC | Age: 84
End: 2024-08-09

## 2024-08-09 PROCEDURE — 99212 OFFICE O/P EST SF 10 MIN: CPT

## 2024-08-09 NOTE — DISCUSSION/SUMMARY
[de-identified] : After discussing various treatment options with the patient including but not limited to oral medications, physical therapy, exercise modalities as well as interventional spinal injections, we have decided with the following plan:  - Continue home exercises, stretching, activity modification, physical therapy, and conservative care. - Follow-up as needed.

## 2024-08-09 NOTE — DISCUSSION/SUMMARY
[de-identified] : After discussing various treatment options with the patient including but not limited to oral medications, physical therapy, exercise modalities as well as interventional spinal injections, we have decided with the following plan:  - Continue home exercises, stretching, activity modification, physical therapy, and conservative care. - Follow-up as needed.

## 2024-08-09 NOTE — HISTORY OF PRESENT ILLNESS
[Lower back] : lower back [8] : 8 [Dull/Aching] : dull/aching [Radiating] : radiating [Sharp] : sharp [Constant] : constant [Sleep] : sleep [Physical therapy] : physical therapy [Walking] : walking [0] : 0 [Occasional] : occasional [] : no [FreeTextEntry1] : right  [FreeTextEntry6] : vibration  [de-identified] : turning  [de-identified] : L MRI  [TWNoteComboBox1] : 100%

## 2024-08-09 NOTE — PHYSICAL EXAM
[Rotation to right] : rotation to right [de-identified] : Constitutional; Appears well, no apparent distress\par  Ability to communicate: Normal \par  Respiratory: non-labored breathing\par  Skin: No rash noted\par  Head: Normocephalic, atraumatic\par  Neck: no visible thyroid enlargement\par  Eyes: Extraocular movements intact\par  Neurologic: Alert and oriented x3\par  Psychiatric: normal mood, affect and behavior \par  \par   [] : negative facet loading

## 2024-08-09 NOTE — HISTORY OF PRESENT ILLNESS
[Lower back] : lower back [8] : 8 [Dull/Aching] : dull/aching [Radiating] : radiating [Sharp] : sharp [Constant] : constant [Sleep] : sleep [Physical therapy] : physical therapy [Walking] : walking [0] : 0 [Occasional] : occasional [] : no [FreeTextEntry1] : right  [FreeTextEntry6] : vibration  [de-identified] : turning  [de-identified] : L MRI  [TWNoteComboBox1] : 100%

## 2024-08-09 NOTE — PHYSICAL EXAM
[Rotation to right] : rotation to right [de-identified] : Constitutional; Appears well, no apparent distress\par  Ability to communicate: Normal \par  Respiratory: non-labored breathing\par  Skin: No rash noted\par  Head: Normocephalic, atraumatic\par  Neck: no visible thyroid enlargement\par  Eyes: Extraocular movements intact\par  Neurologic: Alert and oriented x3\par  Psychiatric: normal mood, affect and behavior \par  \par   [] : negative facet loading